# Patient Record
Sex: FEMALE | Race: WHITE | NOT HISPANIC OR LATINO | URBAN - METROPOLITAN AREA
[De-identification: names, ages, dates, MRNs, and addresses within clinical notes are randomized per-mention and may not be internally consistent; named-entity substitution may affect disease eponyms.]

---

## 2018-05-24 ENCOUNTER — EMERGENCY (EMERGENCY)
Facility: HOSPITAL | Age: 78
LOS: 1 days | Discharge: HOME | End: 2018-05-24
Attending: EMERGENCY MEDICINE
Payer: COMMERCIAL

## 2018-05-24 VITALS
HEART RATE: 85 BPM | OXYGEN SATURATION: 100 % | SYSTOLIC BLOOD PRESSURE: 135 MMHG | RESPIRATION RATE: 16 BRPM | DIASTOLIC BLOOD PRESSURE: 86 MMHG

## 2018-05-24 VITALS
HEART RATE: 93 BPM | OXYGEN SATURATION: 100 % | TEMPERATURE: 98 F | RESPIRATION RATE: 16 BRPM | DIASTOLIC BLOOD PRESSURE: 79 MMHG | SYSTOLIC BLOOD PRESSURE: 149 MMHG

## 2018-05-24 LAB
ALBUMIN SERPL ELPH-MCNC: 4.7 G/DL — SIGNIFICANT CHANGE UP (ref 3.3–5)
ALP SERPL-CCNC: 65 U/L — SIGNIFICANT CHANGE UP (ref 40–120)
ALT FLD-CCNC: 43 U/L — SIGNIFICANT CHANGE UP (ref 10–45)
ANION GAP SERPL CALC-SCNC: 10 MMOL/L — SIGNIFICANT CHANGE UP (ref 5–17)
APPEARANCE UR: CLEAR — SIGNIFICANT CHANGE UP
APTT BLD: 30.5 SEC — SIGNIFICANT CHANGE UP (ref 27.5–37.4)
AST SERPL-CCNC: 60 U/L — HIGH (ref 10–40)
BASOPHILS # BLD AUTO: 0.1 K/UL — SIGNIFICANT CHANGE UP (ref 0–0.2)
BASOPHILS NFR BLD AUTO: 0.9 % — SIGNIFICANT CHANGE UP (ref 0–2)
BILIRUB SERPL-MCNC: 0.5 MG/DL — SIGNIFICANT CHANGE UP (ref 0.2–1.2)
BILIRUB UR-MCNC: NEGATIVE — SIGNIFICANT CHANGE UP
BUN SERPL-MCNC: 17 MG/DL — SIGNIFICANT CHANGE UP (ref 7–23)
CALCIUM SERPL-MCNC: 10.1 MG/DL — SIGNIFICANT CHANGE UP (ref 8.4–10.5)
CHLORIDE SERPL-SCNC: 98 MMOL/L — SIGNIFICANT CHANGE UP (ref 96–108)
CO2 SERPL-SCNC: 27 MMOL/L — SIGNIFICANT CHANGE UP (ref 22–31)
COLOR SPEC: COLORLESS — SIGNIFICANT CHANGE UP
CREAT SERPL-MCNC: 0.72 MG/DL — SIGNIFICANT CHANGE UP (ref 0.5–1.3)
DIFF PNL FLD: NEGATIVE — SIGNIFICANT CHANGE UP
EOSINOPHIL # BLD AUTO: 0.3 K/UL — SIGNIFICANT CHANGE UP (ref 0–0.5)
EOSINOPHIL NFR BLD AUTO: 3.4 % — SIGNIFICANT CHANGE UP (ref 0–6)
GLUCOSE SERPL-MCNC: 147 MG/DL — HIGH (ref 70–99)
GLUCOSE UR QL: NEGATIVE — SIGNIFICANT CHANGE UP
HCT VFR BLD CALC: 42.9 % — SIGNIFICANT CHANGE UP (ref 34.5–45)
HGB BLD-MCNC: 14.8 G/DL — SIGNIFICANT CHANGE UP (ref 11.5–15.5)
INR BLD: 0.92 RATIO — SIGNIFICANT CHANGE UP (ref 0.88–1.16)
KETONES UR-MCNC: NEGATIVE — SIGNIFICANT CHANGE UP
LEUKOCYTE ESTERASE UR-ACNC: NEGATIVE — SIGNIFICANT CHANGE UP
LIDOCAIN IGE QN: 112 U/L — HIGH (ref 7–60)
LYMPHOCYTES # BLD AUTO: 2.5 K/UL — SIGNIFICANT CHANGE UP (ref 1–3.3)
LYMPHOCYTES # BLD AUTO: 31.3 % — SIGNIFICANT CHANGE UP (ref 13–44)
MCHC RBC-ENTMCNC: 32.2 PG — SIGNIFICANT CHANGE UP (ref 27–34)
MCHC RBC-ENTMCNC: 34.5 GM/DL — SIGNIFICANT CHANGE UP (ref 32–36)
MCV RBC AUTO: 93.4 FL — SIGNIFICANT CHANGE UP (ref 80–100)
MONOCYTES # BLD AUTO: 0.6 K/UL — SIGNIFICANT CHANGE UP (ref 0–0.9)
MONOCYTES NFR BLD AUTO: 8 % — SIGNIFICANT CHANGE UP (ref 2–14)
NEUTROPHILS # BLD AUTO: 4.5 K/UL — SIGNIFICANT CHANGE UP (ref 1.8–7.4)
NEUTROPHILS NFR BLD AUTO: 56.4 % — SIGNIFICANT CHANGE UP (ref 43–77)
NITRITE UR-MCNC: NEGATIVE — SIGNIFICANT CHANGE UP
PH UR: 7.5 — SIGNIFICANT CHANGE UP (ref 5–8)
PLATELET # BLD AUTO: 197 K/UL — SIGNIFICANT CHANGE UP (ref 150–400)
POTASSIUM SERPL-MCNC: 5.2 MMOL/L — SIGNIFICANT CHANGE UP (ref 3.5–5.3)
POTASSIUM SERPL-SCNC: 5.2 MMOL/L — SIGNIFICANT CHANGE UP (ref 3.5–5.3)
PROT SERPL-MCNC: 7.8 G/DL — SIGNIFICANT CHANGE UP (ref 6–8.3)
PROT UR-MCNC: NEGATIVE — SIGNIFICANT CHANGE UP
PROTHROM AB SERPL-ACNC: 10 SEC — SIGNIFICANT CHANGE UP (ref 9.8–12.7)
RBC # BLD: 4.59 M/UL — SIGNIFICANT CHANGE UP (ref 3.8–5.2)
RBC # FLD: 12.6 % — SIGNIFICANT CHANGE UP (ref 10.3–14.5)
SODIUM SERPL-SCNC: 135 MMOL/L — SIGNIFICANT CHANGE UP (ref 135–145)
SP GR SPEC: 1.01 — LOW (ref 1.01–1.02)
UROBILINOGEN FLD QL: NEGATIVE — SIGNIFICANT CHANGE UP
WBC # BLD: 8 K/UL — SIGNIFICANT CHANGE UP (ref 3.8–10.5)
WBC # FLD AUTO: 8 K/UL — SIGNIFICANT CHANGE UP (ref 3.8–10.5)

## 2018-05-24 PROCEDURE — 70450 CT HEAD/BRAIN W/O DYE: CPT | Mod: 26

## 2018-05-24 PROCEDURE — 99284 EMERGENCY DEPT VISIT MOD MDM: CPT | Mod: 25

## 2018-05-24 PROCEDURE — 85730 THROMBOPLASTIN TIME PARTIAL: CPT

## 2018-05-24 PROCEDURE — 71045 X-RAY EXAM CHEST 1 VIEW: CPT | Mod: 26

## 2018-05-24 PROCEDURE — 72170 X-RAY EXAM OF PELVIS: CPT

## 2018-05-24 PROCEDURE — 85027 COMPLETE CBC AUTOMATED: CPT

## 2018-05-24 PROCEDURE — 81003 URINALYSIS AUTO W/O SCOPE: CPT

## 2018-05-24 PROCEDURE — 93005 ELECTROCARDIOGRAM TRACING: CPT

## 2018-05-24 PROCEDURE — 72170 X-RAY EXAM OF PELVIS: CPT | Mod: 26

## 2018-05-24 PROCEDURE — 70450 CT HEAD/BRAIN W/O DYE: CPT

## 2018-05-24 PROCEDURE — 71045 X-RAY EXAM CHEST 1 VIEW: CPT

## 2018-05-24 PROCEDURE — 80053 COMPREHEN METABOLIC PANEL: CPT

## 2018-05-24 PROCEDURE — 83690 ASSAY OF LIPASE: CPT

## 2018-05-24 PROCEDURE — 85610 PROTHROMBIN TIME: CPT

## 2018-05-24 PROCEDURE — 93010 ELECTROCARDIOGRAM REPORT: CPT

## 2018-05-24 PROCEDURE — 99285 EMERGENCY DEPT VISIT HI MDM: CPT | Mod: 25

## 2018-05-24 NOTE — ED PROVIDER NOTE - CARE PLAN
Principal Discharge DX:	Injury of head, initial encounter  Assessment and plan of treatment:	You were seen in the Emergency Department for head injury. Your examination and lab tests were reassuring. Please follow up with your regular physician this week for reevaluation. Take tylenol as needed for pain. Please return to the Emergency Department if you have any new concerning symptoms such as severe pain, weakness, or any other concerning symptoms.
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
## 2018-05-24 NOTE — ED PROVIDER NOTE - MUSCULOSKELETAL, MLM
Spine appears normal, range of motion is not limited, no muscle or joint tenderness. No midline spine tenderness
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
## 2018-05-24 NOTE — CONSULT NOTE ADULT - ASSESSMENT
ASSESSMENT: Patient is a 78y old f with occipital subcutaneous hematoma.     PLAN:  - CXR and PXR pending  - C collar cleared  - pain control  - IF xrays negative, cleared for discharge home from trauma perspective  - Will follow   PT seen and examined with Dr. Servando Lafleur
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
## 2024-11-24 NOTE — ED ADULT NURSE NOTE - CINV DISCH TEACH PARTICIP
[facility] EMERGENCY DEPARTMENT  Emergency Department Encounter  Emergency Medicine Physician Note     Pt Name:Kathy Elizondo  MRN: [id number]  Birthdate 1942  Date of evaluation: 11/24/2024  PCP:  Rashmi Burgess APRN  Note Started: 9:04 AM EST      CHIEF COMPLAINT       Chief Complaint   Patient presents with    Abnormal Lab     Pt reports being seen at PCP on Friday and today got results of low hemoglobin 6.2. pt states feels SOA at times and generalized fatigued. Pt takes blood thinner denies any known source of bleeding        HISTORY OF PRESENT ILLNESS  (Location/Symptom, Timing/Onset, Context/Setting, Quality, Duration, Modifying Factors, Severity.)      Kathy Elizondo is a 82 y.o. female who presents with concerns for anemia.  Patient sees her daughter who is her nurse practitioner who obtained labs and is present in the room.  Patient was noted to be anemic on laboratory evaluation.  Patient denies any dark tarry stools, any concerns for bleeding.  Patient is on Eliquis, takes 2.5 mg daily.  Patient describes shortness of breath and generalized fatigue has been progressively worsening over the last year but has acutely worsened more in the last couple weeks.  Patient does have a history of iron deficiency anemia, was not on iron supplementations, was recently started on at the end of last week.  Patient does describe dyspnea with exertion.    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, TR    Anemia     Arthritis     GERD (gastroesophageal reflux disease)     History of blood transfusion     No reaction at time of ectopic pregnancy    History of transesophageal echocardiography (HARRIET) 10/2019    Hx of blood clots     legs, 15years ago    Hx of echocardiogram     Hyperlipidemia     Hypertension     Knee pain, left     Seasonal  allergies     Stroke     several TIA no focal defecits    Varicose veins     Wears dentures     upper and lower    Wears reading eyeglasses      No additional pertinent       Past Surgical History:   Procedure Laterality Date    CATARACT EXTRACTION W/ INTRAOCULAR LENS IMPLANT Right 1/18/2023    Procedure: CATARACT PHACO EXTRACTION WITH INTRAOCULAR LENS IMPLANT RIGHT;  Surgeon: Antonio Reynaga MD;  Location: [facility] OR;  Service: Ophthalmology;  Laterality: Right;    CATARACT EXTRACTION W/ INTRAOCULAR LENS IMPLANT Left 2/15/2023    Procedure: CATARACT PHACO EXTRACTION WITH INTRAOCULAR LENS IMPLANT LEFT;  Surgeon: Antonio Reynaga MD;  Location: [facility] OR;  Service: Ophthalmology;  Laterality: Left;    COLONOSCOPY      DILATION AND CURETTAGE, DIAGNOSTIC / THERAPEUTIC      ENDOSCOPY N/A 6/20/2022    Procedure: ESOPHAGOGASTRODUODENOSCOPY with biopsy;  Surgeon: Keith Negro MD;  Location: [facility] ENDOSCOPY;  Service: Gastroenterology;  Laterality: N/A;    JOINT REPLACEMENT Left     knee    SUBTOTAL HYSTERECTOMY      TOTAL KNEE ARTHROPLASTY Right 10/19/2023    Procedure: TOTAL KNEE ARTHROPLASTY WITH MARINO ROBOT - RIGHT;  Surgeon: Olu Del Rio MD;  Location: [facility] OR;  Service: Robotics - Ortho;  Laterality: Right;    VEIN LIGATION AND STRIPPING BILATERAL       No additional pertinent       Social History     Socioeconomic History    Marital status:    Tobacco Use    Smoking status: Never     Passive exposure: Never    Smokeless tobacco: Never   Vaping Use    Vaping status: Never Used   Substance and Sexual Activity    Alcohol use: No    Drug use: No    Sexual activity: Defer       Family History   Problem Relation Age of Onset    Lung disease Mother     Heart disease Father         CABG    Heart disease Brother         CABG       Allergies:  Patient has no known allergies.    Home Medications:  Prior to Admission medications    Medication Sig Start Date End Date Taking? Authorizing Provider  "  apixaban (ELIQUIS) 2.5 MG tablet tablet Take 1 tablet by mouth Every 12 (Twelve) Hours. Instructed per ARNP hold 3-5 days prior to surgery 10/20/23   Dany Lobato MD   cloNIDine (CATAPRES) 0.1 MG tablet Take 1 tablet by mouth 3 (Three) Times a Day As Needed for High Blood Pressure. 4/9/24   Georgi Hartman DO   difluprednate (DUREZOL) 0.05 % ophthalmic emulsion Follow Instructions on AVS  Patient taking differently: Administer 1 drop to both eyes As Needed. Follow Instructions on AVS 1/18/23   Antonio Reynaga MD   donepezil (ARICEPT) 10 MG tablet Take 1 tablet by mouth Every Night.    ProviderRodriguez MD   losartan (COZAAR) 50 MG tablet Take 1 tablet by mouth Daily.    Rodriguez Pete MD   lovastatin (MEVACOR) 20 MG tablet Take 1 tablet by mouth Every Night.    Rodriguez Pete MD   omeprazole (priLOSEC) 40 MG capsule Take 1 capsule by mouth Daily.    ProviderRodriguez MD         REVIEW OF SYSTEMS       Review of Systems   Constitutional:  Positive for fatigue. Negative for diaphoresis and fever.   Respiratory:  Negative for chest tightness and shortness of breath.    Cardiovascular:  Negative for chest pain.   Gastrointestinal:  Negative for abdominal pain, nausea and vomiting.   Endocrine: Negative for polyuria.   Genitourinary:  Negative for difficulty urinating and frequency.   Neurological:  Positive for dizziness and light-headedness.       PHYSICAL EXAM      INITIAL VITALS:   /66   Pulse 80   Temp 98.2 °F (36.8 °C) (Oral)   Resp 16   Ht 152.4 cm (60\")   Wt 58.1 kg (128 lb)   SpO2 99%   BMI 25.00 kg/m²     Physical Exam  Constitutional:       Appearance: Normal appearance.   HENT:      Head: Normocephalic and atraumatic.      Mouth/Throat:      Mouth: Mucous membranes are moist.      Pharynx: Oropharynx is clear.   Eyes:      Comments: Conjunctival pallor noted   Cardiovascular:      Rate and Rhythm: Normal rate and regular rhythm.      Pulses: Normal " pulses.   Pulmonary:      Effort: Pulmonary effort is normal.      Breath sounds: Normal breath sounds.   Abdominal:      General: Abdomen is flat. There is no distension.      Palpations: Abdomen is soft.      Tenderness: There is no abdominal tenderness. There is no guarding.   Musculoskeletal:         General: Normal range of motion.   Skin:     General: Skin is warm and dry.      Coloration: Skin is pale.   Neurological:      General: No focal deficit present.      Mental Status: She is alert and oriented to person, place, and time.   Psychiatric:         Mood and Affect: Mood normal.         Behavior: Behavior normal.           DDX/DIAGNOSTIC RESULTS / EMERGENCY DEPARTMENT COURSE / MDM     Differential Diagnosis included but not limited: Iron deficiency anemia, acute GI bleed, other causes of anemia.    Diagnoses Considered but Do Not Suspect:   low concern for active hemorrhage    Decision Rules/Scores utilized: N/A     Tests considered but not ordered and why: CT abdomen pelvis, do not feel that this will have any diagnostic benefit as patient has no abdominal pain, nonperitoneal, no concerns for severe GI bleed.    MIPS: N/A     Code Status Discussion:  Not Discussed    Additional Patient Education Provided: None     Medical Decision Making    Medical Decision Making  Demonstrate concerns for anemia.  Patient denies lightheadedness dizziness and fatigue has been going on for multiple months.  Patient was stable vitals, nontachycardic, labs here demonstrated severe anemia.  Patient did have Hemoccult positive.  Stool was brown in color, no gross blood noted.  Patient's BUN noted to be normal, low concerns for severe GI bleed at this time.  Patient appeared clinically well throughout stay in the emergency department.  Unit of blood given to patient due to severely low hemoglobin.  Patient instructed to follow-up with PCP for possible iron infusions.  Patient also instructed to follow-up with her surgeon for  colonoscopy.  They were agreeable with the plan.  Patient appeared clinically well and stable at time of discharge.  No indication for further workup or imaging at this time.  Patient have close follow-up with her PCP.  Patient's case discussed with her PCP who is her daughter in the room.  Do feel patient was stable for discharge.  Instructed return for any lightheadedness dizziness, complication secondary to blood transfusion including allergic reaction or any other concerns.    Problems Addressed:  Iron deficiency anemia due to chronic blood loss: complicated acute illness or injury    Amount and/or Complexity of Data Reviewed  Labs: ordered. Decision-making details documented in ED Course.  ECG/medicine tests: ordered.        See ED COURSE for additional MDM statements    EKG    EKG Interpretation    Evaluated and interpreted by emergency department physician    Rhythm: Normal Sinus Rhythm  Rate: Normal  Axis: Geovanna  Ectopy: none  Conduction: Normal  ST Segments: Normal  T Waves: Normal  Q Waves: None    Clinical Impression: Normal Sinus Rhythm    Georgi Hartman DO      All EKG's are interpreted by the Emergency Department Physician who either signs or Co-signs this chart in the absence of a cardiologist.    Additional Scores                   EMERGENCY DEPARTMENT COURSE:    ED Course as of 11/26/24 1007   Sun Nov 24, 2024   0937 Hemoglobin(!!): 5.4  Patient noted to be severely anemic. [CR]   1055 Fecal: Obtained, digital rectal exam demonstrated no gross blood, brown stool noted, not black and tarry.  No concerns for severe GI bleed.  Patient noted to have a creatinine and BUN that are normal, no elevated BUN as ratio, low concerns for upper GI bleed. [CR]   1112 Fecal Occult Blood(!): Positive  We will call noted to be positive. [CR]   1250 Did discuss fecal occult with patient and patient's PCP.  They do feel outpatient workup is appropriate this time as patient has no elevated BUN, appears  clinically well with stable vitals.  Patient receiving blood product this time. [CR]      ED Course User Index  [CR] Georgi Hartman,        PROCEDURES:  None Performed   Procedures    DATA FOR LAB AND RADIOLOGY TESTS ORDERED BELOW ARE REVIEWED BY THE ED CLINICIAN:    RADIOLOGY: All x-rays, CT, MRI, and formal ultrasound images (except ED bedside ultrasound) are read by the radiologist, see reports below, unless otherwise noted in MDM or here.  Reports below are reviewed by myself.  No orders to display       LABS: Lab orders shown below, the results are reviewed by myself, and all abnormals are listed below.  Labs Reviewed   CBC WITH AUTO DIFFERENTIAL - Abnormal; Notable for the following components:       Result Value    RBC 3.00 (*)     Hemoglobin 5.4 (*)     Hematocrit 20.7 (*)     MCV 69.0 (*)     MCH 18.0 (*)     MCHC 26.1 (*)     RDW 16.8 (*)     Lymphocyte % 15.2 (*)     Immature Grans % 0.6 (*)     All other components within normal limits   COMPREHENSIVE METABOLIC PANEL - Abnormal; Notable for the following components:    BUN/Creatinine Ratio 26.8 (*)     All other components within normal limits    Narrative:     GFR Normal >60  Chronic Kidney Disease <60  Kidney Failure <15    The GFR formula is only valid for adults with stable renal function between ages 18 and 70.   PROTIME-INR - Abnormal; Notable for the following components:    Protime 16.7 (*)     INR 1.30 (*)     All other components within normal limits    Narrative:     Suggested INR therapeutic range for stable oral anticoagulant therapy:    Low Intensity therapy:   1.5-2.0  Moderate Intensity therapy:   2.0-3.0  High Intensity therapy:   2.5-4.0   OCCULT BLOOD X 1, STOOL - Abnormal; Notable for the following components:    Fecal Occult Blood Positive (*)     All other components within normal limits   SINGLE HS TROPONIN T - Abnormal; Notable for the following components:    HS Troponin T 16 (*)     All other components within  normal limits    Narrative:     High Sensitive Troponin T Reference Range:  <14.0 ng/L- Negative Female for AMI  <22.0 ng/L- Negative Male for AMI  >=14 - Abnormal Female indicating possible myocardial injury.  >=22 - Abnormal Male indicating possible myocardial injury.   Clinicians would have to utilize clinical acumen, EKG, Troponin, and serial changes to determine if it is an Acute Myocardial Infarction or myocardial injury due to an underlying chronic condition.        APTT - Normal   BNP (IN-HOUSE) - Normal    Narrative:     This assay is used as an aid in the diagnosis of individuals suspected of having heart failure. It can be used as an aid in the diagnosis of acute decompensated heart failure (ADHF) in patients presenting with signs and symptoms of ADHF to the emergency department (ED). In addition, NT-proBNP of <300 pg/mL indicates ADHF is not likely.    Age Range Result Interpretation  NT-proBNP Concentration (pg/mL:      <50             Positive            >450                   Gray                 300-450                    Negative             <300    50-75           Positive            >900                  Gray                300-900                  Negative            <300      >75             Positive            >1800                  Gray                300-1800                  Negative            <300   SCAN SLIDE   SCANNED - LABS   TYPE AND SCREEN   PREPARE RBC       Vitals Reviewed:    Vitals:    11/24/24 1311 11/24/24 1319 11/24/24 1329 11/24/24 1339   BP: 123/63 118/56 125/65 132/66   BP Location:       Patient Position:       Pulse: 78 75 82 80   Resp:       Temp:       TempSrc:       SpO2: 96% 95% 98% 99%   Weight:       Height:           MEDICATIONS GIVEN TO PATIENT THIS ENCOUNTER:  Medications - No data to display    CONSULTS:  None    CRITICAL CARE:  There was significant risk of life threatening deterioration of patient's condition requiring my direct management. Critical care time 0  minutes, excluding any documented procedures.    FINAL IMPRESSION      1. Iron deficiency anemia due to chronic blood loss          DISPOSITION / PLAN     ED Disposition       ED Disposition   Discharge    Condition   Stable    Comment   --               PATIENT REFERRED TO:  Rashmi Burgess APRN [address]  [facility] [place] [id number] [phone]    Schedule an appointment as soon as possible for a visit in 2 days      Keith Negro MD  [address]  [facility] [id number] [phone]      Contact your surgeon for colonoscopy in the future due to having Hemoccult positive stool      DISCHARGE MEDICATIONS:     Medication List        CONTINUE taking these medications      apixaban 2.5 MG tablet tablet  Commonly known as: ELIQUIS  Take 1 tablet by mouth Every 12 (Twelve) Hours. Instructed per ARNP hold 3-5 days prior to surgery     donepezil 10 MG tablet  Commonly known as: ARICEPT              Electronically signed by Georgi Hartman DO, 11/24/24, 9:04 AM EST.    Emergency Medicine Physician  [facility]  (Please note that portions of thisnote were completed with a voice recognition program.  Efforts were made to edit the dictations but occasionally words are mis-transcribed.)       Georgi Hartman DO  11/26/24 1015     Family/Patient

## 2025-01-31 NOTE — ED ADULT NURSE NOTE - ATTEMPT TO OOB
Visit Type: follow-up visit  PCP/Referring provider:  Meme Ladd MD    Chief Complaint   Patient presents with    Office Visit     Uroflow test       History obtained from: Patient and Mother    History of Present Illness:  Leandra is a 17 year old female presenting for f/u for  intermittent daytime incontinence and enuresis that has worsened over the past 2 years. Since her visit ~ 3 weeks ago, urinary symptoms remain unchanged so uroflow will be completed today. Reports incontinence started in 7th and 8th grade, and was mostly at night. Daytime accidents were initially once daily in high school and now are 1-2 times per day. Night time accidents are less than once per month. Leandra reports she has them when she is dreaming she is voiding. She reports ~ 2 times per week, she wakes up with urgency, and has a urine accident. Currently wearing pull up at night.  Measures to reduce night time accidents includes voiding before bedtime, restricting fluid intake 2 hours before bedtime, and avoiding caffeine. A bed alarm and desmopressin has not been tried.     Leandra reports urinary urgency, and that incontinence happens on the way to bathroom. Incontinence is described as leaking, but she also has voided through her clothes. Denies dysuria, hesitancy,  difficulty urinating, weakness of the urinary stream, needing to strain, deviation of the urinary stream , and/or sense of incomplete emptying. She does not show evidence of voiding postponement/holding, such as \"potty-dancing,\" standing on tiptoes, forcefully crossing  her  legs, or squatting. Leandra reports voiding 2-3 times at school, before and after gymnastics, on arrival home and at bedtime. Mom reports frequency and that she has always needed to void frequently.     Leandra completed a voiding diary (under media). Frequency of voiding was ~1 to 1.5 hours, with 50% of the time recorded sx of urgency. She indicated \"prompted\" on the diary when she felt the need to void, but  not urgently.    There is not a hx of UTIs. Her maternal grandmother has a hx of bedwetting until age 19, but Mom reports it is secondary to trauma.    Of note, Leandra has started Naprosyn for lower back pain that feels like \"a bruised bone.\" She reports this pain restarts yearly with gymnastics.      Past Medical History:  Patient Active Problem List   Diagnosis    Intermittent daytime urinary incontinence    Frequency of urination    Urgency of urination    Hx of low back pain   Back pain 07/18/2018 7/18/18---> High- level gymnast back pain. X-ray were taken, no obvious spondylolysis or spondylolisthesis. l am concerned about s stress reaction and stress fracture.   5/2022 pain is better and no longer wearing a brace  11/23/2022 [facility]: possible spondylolysis. Recommend NM bone scan, MRI lumbar spine, PT and naproxen 500mg BID  1/11/2024 Dr. Henderson at [facility] recommended MRI to determine if surgical intervention is required    Clotting disorder (HCC) 07/01/2009  Cerebral Venous Sinus Thromosis & left femoral DVT.   Anxiety                                                       Birth History:   Gestational Age at Birth: Full-Term or Post-Term  Prenatal Ultrasound Findings: Normal    Past Surgical History:    ADENOIDECTOMY                                                 TYMPANOSTOMY                                                  TYMPANOSTOMY                                                  Current Medications:  Current Outpatient Medications   Medication Sig Dispense Refill    naproxen (NAPROSYN) 125 MG/5ML suspension 2 times daily.      sertraline (ZOLOFT) 25 MG tablet Take 75 mg by mouth daily.       No current facility-administered medications for this visit.     Allergies:  Allergies   Allergen Reactions    Adhesive   (Environmental) RASH       Family History:  Family History       Relation Problem Comments    Mother Hypothyroid        Paternal Aunt Cancer, Ovarian               Anesthesia-related Problems or Bleeding Disorders: No    Social History:  Social History     Tobacco Use    Smoking status: Not on file    Smokeless tobacco: Not on file   Substance Use Topics    Alcohol use: Not on file     Review Of Systems:  Neurological: No seizures, no numbness or tingling in extremities  Gastrointestinal: Negative, except as stated in the HPI  Genitourinary: See HPI  Musculoskeletal: No abnormal gait, No leg weakness, continued low back pain in lumbar spine. Physical    Exam:    Visit Vitals  /74 (BP Location: RFA - Right forearm, Patient Position: Sitting, Cuff Size: Small Adult)   Pulse 70   Ht 5' 4.57\" (1.64 m)   Wt 63.8 kg (140 lb 10.5 oz)   LMP 01/02/2025 (Approximate)   BMI 23.72 kg/m²     Constitutional: well developed, in no acute distress  Psychiatric: behavior/orientation age appropriate, mood and affect normal  HEENT: head normocephalic and atraumatic,  Respiratory: unlabored respiratory effort  Cardiovascular: extremities warm and well perfused and no peripheral edema  Abdomen: soft,  nondistended;  Genitourinary: (last exam 1/10/25)  Musculoskeletal: ambulating freely      Lab Results  1/6/25 UA (POC): negative   Urine culture: <10,000 CFU/ml mixed bacterial [place]    Cr Trend  No results found for: \"CREATININE\"    Results for orders placed or performed in visit on 01/31/25   POCT BLADDER SCAN   Result Value Ref Range    BLDR Scan mLs 277ml        Bladder Scans  1/10/25 PVR 6 mL    Imaging Results (reviewed report(s) & images)    1/10/25 KUB: small amt of stool    1/29/25 US Kidney/Bladder   Right kidney: 10.4 cm; no hydronephrosis, normal parenchyma, no hyperechoic foci  Left kidney: 11.1 cm; no hydronephrosis, normal parenchyma, no hyperechoic foci  Bladder: thin-walled, no lesions, no hydroureter     Uroflow / EMG (01/31/25):  Voided Volume: 280.5 mL  PVR: not completed  Max Flow Rate: 59 mL/s  Average Flow Rate: 32.9 mL/s  [place] shaped flow curve, no pelvic floor  muscle use during void, some abdominal muscle use during void      *Prevoid PVR: 277 mL    Assessment:  1. Intermittent daytime urinary incontinence        17 year old female with intermittent daytime urinary incontinence,   secondary enuresis, urgency, and  frequency.     Plan:  Orders Placed This Encounter    POCT BLADDER SCAN       Timed voiding every 2 hours during the day (school note given). More frequent voiding will allow the bladder wall to be under less strain during the day, which will hopefully help reduce the number of incontinence episodes.     Will review uroflow with Dr. Negron.   Possible start of anticholinergic for overactive bladder    If symptoms persist, consider imaging of back due to hx of multiple back injuries from gymnastics    The above assessment and plan was reviewed with Leandra's mother, who indicated understanding and agreement. All questions were answered to their expressed satisfaction.    I spent 30 minutes of family face-to-face time, record/imaging review, and documentation time.      ANGELO Viveros, FNP-C  [facility]  [facility]     no